# Patient Record
Sex: FEMALE | Race: BLACK OR AFRICAN AMERICAN | Employment: OTHER | URBAN - METROPOLITAN AREA
[De-identification: names, ages, dates, MRNs, and addresses within clinical notes are randomized per-mention and may not be internally consistent; named-entity substitution may affect disease eponyms.]

---

## 2024-02-22 ENCOUNTER — OFFICE VISIT (OUTPATIENT)
Age: 64
End: 2024-02-22

## 2024-02-22 VITALS
WEIGHT: 240 LBS | DIASTOLIC BLOOD PRESSURE: 79 MMHG | HEART RATE: 71 BPM | RESPIRATION RATE: 18 BRPM | TEMPERATURE: 98.4 F | OXYGEN SATURATION: 95 % | SYSTOLIC BLOOD PRESSURE: 122 MMHG | HEIGHT: 63 IN | BODY MASS INDEX: 42.52 KG/M2

## 2024-02-22 DIAGNOSIS — J98.8 VIRAL RESPIRATORY ILLNESS: Primary | ICD-10-CM

## 2024-02-22 DIAGNOSIS — R05.8 RECURRENT DRY COUGH: ICD-10-CM

## 2024-02-22 DIAGNOSIS — B97.89 VIRAL RESPIRATORY ILLNESS: Primary | ICD-10-CM

## 2024-02-22 PROBLEM — N93.9 ABNORMAL UTERINE BLEEDING: Status: ACTIVE | Noted: 2024-02-22

## 2024-02-22 PROBLEM — R93.89 THICKENED ENDOMETRIUM: Status: ACTIVE | Noted: 2024-02-22

## 2024-02-22 RX ORDER — PHENTERMINE HYDROCHLORIDE 37.5 MG/1
CAPSULE ORAL
COMMUNITY

## 2024-02-22 RX ORDER — LATANOPROST 50 UG/ML
SOLUTION/ DROPS OPHTHALMIC
COMMUNITY
Start: 2024-02-22

## 2024-02-22 RX ORDER — TIMOLOL MALEATE 5 MG/ML
SOLUTION/ DROPS OPHTHALMIC
COMMUNITY
Start: 2024-02-22

## 2024-02-22 RX ORDER — BENZONATATE 200 MG/1
200 CAPSULE ORAL 3 TIMES DAILY PRN
Qty: 30 CAPSULE | Refills: 0 | Status: SHIPPED | OUTPATIENT
Start: 2024-02-22 | End: 2024-03-03

## 2024-02-22 NOTE — PROGRESS NOTES
Vee Nunez (:  1960) is a 63 y.o. female,New patient, here for evaluation of the following chief complaint(s):  Cough (Cough for 7 days )      ASSESSMENT/PLAN:  Visit Diagnoses and Associated Orders       Viral respiratory illness    -  Primary    benzonatate (TESSALON) 200 MG capsule [57789]           Recurrent dry cough        XR CHEST (2 VIEWS) [23676 CPT(R)]   - Future Order    benzonatate (TESSALON) 200 MG capsule [95244]           ORDERS WITHOUT AN ASSOCIATED DIAGNOSIS    timolol (TIMOPTIC) 0.5 % ophthalmic solution [95038]      phentermine 37.5 MG capsule [6233]      latanoprost (XALATAN) 0.005 % ophthalmic solution [63009]            • CXR without infiltrate or other acute cardiopulmonary findings. Lungs CTAB. Suspect viral respiratory illness. Mild symptoms, VSS, afebrile. To treat for viral URI with benzonatate 200 mg TID PRN for cough. To use cough drops, cool mist humidifier, rest, increased fluid intake for symptomatic relief. To monitor symptoms and follow-up if symptoms worsen or do not improve on current treatment plan. To ED for severe CP, chest tightness, SOB, rapid worsening of symptoms. Patient verbalized understanding, no questions or concerns at this time .  •   • Follow up in PRN days if symptoms persist or if symptoms worsen.    SUBJECTIVE/OBJECTIVE:  HPI  HPI:   63 y.o. female presents with symptoms of dry cough, chills, rhinitis, HA from coughing x 7 days. She was last seen for a cough in Dec 23, given benzonatate, her cough resolved for awhile and then returned. Denies history of seasonal allergies, GERD, asthma, COPD, tobacco use, choking/aspiration. Denies pain. Using delsym with mild improvement in symptoms. Not drinking enough water makes her symptoms worse. Denies sick contacts. Denies fever/chills/sweats/body aches, CP, chest tightness, SOB, nasal congestion, sore throat, ear pain, sinus pain, diarrhea, abdominal pain, swollen glands, rash.       Vitals:    24 1031